# Patient Record
Sex: FEMALE | URBAN - METROPOLITAN AREA
[De-identification: names, ages, dates, MRNs, and addresses within clinical notes are randomized per-mention and may not be internally consistent; named-entity substitution may affect disease eponyms.]

---

## 2018-08-21 ENCOUNTER — TELEPHONE (OUTPATIENT)
Dept: OTHER | Facility: CLINIC | Age: 73
End: 2018-08-21

## 2018-08-21 NOTE — TELEPHONE ENCOUNTER
Jacky Brown recently joined [employer] as a member. Please contact with any pertinent information regarding any important information with your patient.

## 2024-07-09 LAB
ALBUMIN: 3.9 G/DL (ref 3.5–5)
ANION GAP SERPL CALCULATED.3IONS-SCNC: 9 MMOL/L (ref 5–13)
BUN / CREAT RATIO: 17
BUN BLDV-MCNC: 19 MG/DL (ref 7–25)
CALCIUM SERPL-MCNC: 9.5 MG/DL (ref 8.5–10.5)
CHLORIDE BLD-SCNC: 109 MMOL/L (ref 98–110)
CO2: 26 MMOL/L (ref 22–29)
CREAT SERPL-MCNC: 1.14 MG/DL (ref 0.5–1.2)
EGFR (CKD-EPI): 50 SEE NOTE
ESTIMATED AVERAGE GLUCOSE: 151 MG/DL (ref 68–114)
GLUCOSE BLD-MCNC: 141 MG/DL (ref 71–99)
HBA1C MFR BLD: 6.9 % (ref 4–5.6)
HCT VFR BLD CALC: 37.2 % (ref 35–46)
HEMOGLOBIN: 11.7 G/DL (ref 11.7–15.5)
LEUKOCYTES, BLD: 6.45 10*3/UL (ref 3.8–10.8)
MCH RBC QN AUTO: 29.7 PG (ref 27–33)
MCHC RBC AUTO-ENTMCNC: 31.5 G/DL (ref 30–36)
MCV RBC AUTO: 94.4 FL (ref 80–100)
PDW BLD-RTO: 13.7 % (ref 11–15)
PLATELET # BLD: 275 10*3/UL (ref 140–400)
PMV BLD AUTO: 9.9 FL (ref 9–13)
POTASSIUM SERPL-SCNC: 4.2 MMOL/L (ref 3.5–5.1)
RBC # BLD: 3.94 10*6/UL (ref 3.8–5.1)
SODIUM BLD-SCNC: 144 MMOL/L (ref 135–146)